# Patient Record
Sex: MALE | Race: WHITE | Employment: OTHER | URBAN - METROPOLITAN AREA
[De-identification: names, ages, dates, MRNs, and addresses within clinical notes are randomized per-mention and may not be internally consistent; named-entity substitution may affect disease eponyms.]

---

## 2017-05-22 PROBLEM — R29.898 WEAKNESS OF BOTH LEGS: Status: ACTIVE | Noted: 2017-05-22

## 2017-05-22 PROBLEM — Z91.89 STROKE RISK: Status: ACTIVE | Noted: 2017-05-22

## 2017-05-22 PROBLEM — G62.9 AXONAL POLYNEUROPATHY: Status: ACTIVE | Noted: 2017-05-22

## 2018-06-19 PROBLEM — M79.2 NEUROPATHIC PAIN: Status: ACTIVE | Noted: 2018-06-19

## 2019-02-25 PROBLEM — E66.01 SEVERE OBESITY (HCC): Status: ACTIVE | Noted: 2019-02-25

## 2019-12-02 PROBLEM — G56.03 BILATERAL CARPAL TUNNEL SYNDROME: Status: ACTIVE | Noted: 2019-12-02

## 2020-01-13 RX ORDER — SODIUM CHLORIDE 0.9 % (FLUSH) 0.9 %
5-40 SYRINGE (ML) INJECTION AS NEEDED
Status: CANCELLED | OUTPATIENT
Start: 2020-01-13

## 2020-01-13 RX ORDER — SODIUM CHLORIDE 0.9 % (FLUSH) 0.9 %
5-40 SYRINGE (ML) INJECTION EVERY 8 HOURS
Status: CANCELLED | OUTPATIENT
Start: 2020-01-13

## 2020-01-19 ENCOUNTER — ANESTHESIA EVENT (OUTPATIENT)
Dept: SURGERY | Age: 60
End: 2020-01-19
Payer: COMMERCIAL

## 2020-01-20 ENCOUNTER — ANESTHESIA (OUTPATIENT)
Dept: SURGERY | Age: 60
End: 2020-01-20
Payer: COMMERCIAL

## 2020-01-20 ENCOUNTER — HOSPITAL ENCOUNTER (OUTPATIENT)
Age: 60
Setting detail: OUTPATIENT SURGERY
Discharge: HOME | End: 2020-01-20
Attending: ORTHOPAEDIC SURGERY | Admitting: ORTHOPAEDIC SURGERY
Payer: COMMERCIAL

## 2020-01-20 VITALS
HEART RATE: 60 BPM | TEMPERATURE: 97.9 F | DIASTOLIC BLOOD PRESSURE: 78 MMHG | OXYGEN SATURATION: 95 % | BODY MASS INDEX: 35.94 KG/M2 | SYSTOLIC BLOOD PRESSURE: 140 MMHG | WEIGHT: 265 LBS | RESPIRATION RATE: 16 BRPM

## 2020-01-20 LAB — POTASSIUM BLD-SCNC: 4.1 MMOL/L (ref 3.5–5.1)

## 2020-01-20 PROCEDURE — 77030018836 HC SOL IRR NACL ICUM -A: Performed by: ORTHOPAEDIC SURGERY

## 2020-01-20 PROCEDURE — 76210000020 HC REC RM PH II FIRST 0.5 HR: Performed by: ORTHOPAEDIC SURGERY

## 2020-01-20 PROCEDURE — 74011250636 HC RX REV CODE- 250/636: Performed by: ANESTHESIOLOGY

## 2020-01-20 PROCEDURE — 74011000250 HC RX REV CODE- 250: Performed by: NURSE ANESTHETIST, CERTIFIED REGISTERED

## 2020-01-20 PROCEDURE — 76010000138 HC OR TIME 0.5 TO 1 HR: Performed by: ORTHOPAEDIC SURGERY

## 2020-01-20 PROCEDURE — 76210000063 HC OR PH I REC FIRST 0.5 HR: Performed by: ORTHOPAEDIC SURGERY

## 2020-01-20 PROCEDURE — 77030006603 HC BLD CRPL ENDOSC S&N -B: Performed by: ORTHOPAEDIC SURGERY

## 2020-01-20 PROCEDURE — 74011250636 HC RX REV CODE- 250/636: Performed by: ORTHOPAEDIC SURGERY

## 2020-01-20 PROCEDURE — 77030002986 HC SUT PROL J&J -A: Performed by: ORTHOPAEDIC SURGERY

## 2020-01-20 PROCEDURE — 76060000032 HC ANESTHESIA 0.5 TO 1 HR: Performed by: ORTHOPAEDIC SURGERY

## 2020-01-20 PROCEDURE — 74011000250 HC RX REV CODE- 250: Performed by: ORTHOPAEDIC SURGERY

## 2020-01-20 PROCEDURE — 84132 ASSAY OF SERUM POTASSIUM: CPT

## 2020-01-20 PROCEDURE — 77030010507 HC ADH SKN DERMBND J&J -B: Performed by: ORTHOPAEDIC SURGERY

## 2020-01-20 PROCEDURE — 77030000032 HC CUF TRNQT ZIMM -B: Performed by: ORTHOPAEDIC SURGERY

## 2020-01-20 PROCEDURE — 74011250636 HC RX REV CODE- 250/636: Performed by: NURSE ANESTHETIST, CERTIFIED REGISTERED

## 2020-01-20 RX ORDER — ONDANSETRON 2 MG/ML
INJECTION INTRAMUSCULAR; INTRAVENOUS AS NEEDED
Status: DISCONTINUED | OUTPATIENT
Start: 2020-01-20 | End: 2020-01-20 | Stop reason: HOSPADM

## 2020-01-20 RX ORDER — PROPOFOL 10 MG/ML
INJECTION, EMULSION INTRAVENOUS
Status: DISCONTINUED | OUTPATIENT
Start: 2020-01-20 | End: 2020-01-20 | Stop reason: HOSPADM

## 2020-01-20 RX ORDER — CLINDAMYCIN PHOSPHATE 900 MG/50ML
900 INJECTION INTRAVENOUS ONCE
Status: COMPLETED | OUTPATIENT
Start: 2020-01-20 | End: 2020-01-20

## 2020-01-20 RX ORDER — FLUMAZENIL 0.1 MG/ML
0.2 INJECTION INTRAVENOUS
Status: DISCONTINUED | OUTPATIENT
Start: 2020-01-20 | End: 2020-01-20 | Stop reason: HOSPADM

## 2020-01-20 RX ORDER — SODIUM CHLORIDE, SODIUM LACTATE, POTASSIUM CHLORIDE, CALCIUM CHLORIDE 600; 310; 30; 20 MG/100ML; MG/100ML; MG/100ML; MG/100ML
75 INJECTION, SOLUTION INTRAVENOUS CONTINUOUS
Status: DISCONTINUED | OUTPATIENT
Start: 2020-01-20 | End: 2020-01-20 | Stop reason: HOSPADM

## 2020-01-20 RX ORDER — LIDOCAINE HYDROCHLORIDE 20 MG/ML
INJECTION, SOLUTION EPIDURAL; INFILTRATION; INTRACAUDAL; PERINEURAL AS NEEDED
Status: DISCONTINUED | OUTPATIENT
Start: 2020-01-20 | End: 2020-01-20 | Stop reason: HOSPADM

## 2020-01-20 RX ORDER — SODIUM CHLORIDE, SODIUM LACTATE, POTASSIUM CHLORIDE, CALCIUM CHLORIDE 600; 310; 30; 20 MG/100ML; MG/100ML; MG/100ML; MG/100ML
100 INJECTION, SOLUTION INTRAVENOUS CONTINUOUS
Status: DISCONTINUED | OUTPATIENT
Start: 2020-01-20 | End: 2020-01-20 | Stop reason: HOSPADM

## 2020-01-20 RX ORDER — FENTANYL CITRATE 50 UG/ML
100 INJECTION, SOLUTION INTRAMUSCULAR; INTRAVENOUS AS NEEDED
Status: DISCONTINUED | OUTPATIENT
Start: 2020-01-20 | End: 2020-01-20 | Stop reason: HOSPADM

## 2020-01-20 RX ORDER — HYDROMORPHONE HYDROCHLORIDE 2 MG/ML
0.5 INJECTION, SOLUTION INTRAMUSCULAR; INTRAVENOUS; SUBCUTANEOUS
Status: DISCONTINUED | OUTPATIENT
Start: 2020-01-20 | End: 2020-01-20 | Stop reason: HOSPADM

## 2020-01-20 RX ORDER — LIDOCAINE HYDROCHLORIDE 10 MG/ML
INJECTION INFILTRATION; PERINEURAL AS NEEDED
Status: DISCONTINUED | OUTPATIENT
Start: 2020-01-20 | End: 2020-01-20 | Stop reason: HOSPADM

## 2020-01-20 RX ORDER — OXYCODONE HYDROCHLORIDE 5 MG/1
5 TABLET ORAL
Status: DISCONTINUED | OUTPATIENT
Start: 2020-01-20 | End: 2020-01-20 | Stop reason: HOSPADM

## 2020-01-20 RX ORDER — MIDAZOLAM HYDROCHLORIDE 1 MG/ML
2 INJECTION, SOLUTION INTRAMUSCULAR; INTRAVENOUS
Status: COMPLETED | OUTPATIENT
Start: 2020-01-20 | End: 2020-01-20

## 2020-01-20 RX ORDER — SODIUM CHLORIDE 0.9 % (FLUSH) 0.9 %
5-40 SYRINGE (ML) INJECTION EVERY 8 HOURS
Status: DISCONTINUED | OUTPATIENT
Start: 2020-01-20 | End: 2020-01-20 | Stop reason: HOSPADM

## 2020-01-20 RX ORDER — BUPIVACAINE HYDROCHLORIDE 5 MG/ML
INJECTION, SOLUTION EPIDURAL; INTRACAUDAL AS NEEDED
Status: DISCONTINUED | OUTPATIENT
Start: 2020-01-20 | End: 2020-01-20 | Stop reason: HOSPADM

## 2020-01-20 RX ORDER — LIDOCAINE HYDROCHLORIDE 10 MG/ML
0.1 INJECTION INFILTRATION; PERINEURAL AS NEEDED
Status: DISCONTINUED | OUTPATIENT
Start: 2020-01-20 | End: 2020-01-20 | Stop reason: HOSPADM

## 2020-01-20 RX ORDER — NALOXONE HYDROCHLORIDE 0.4 MG/ML
0.1 INJECTION, SOLUTION INTRAMUSCULAR; INTRAVENOUS; SUBCUTANEOUS AS NEEDED
Status: DISCONTINUED | OUTPATIENT
Start: 2020-01-20 | End: 2020-01-20 | Stop reason: HOSPADM

## 2020-01-20 RX ORDER — SODIUM CHLORIDE 0.9 % (FLUSH) 0.9 %
5-40 SYRINGE (ML) INJECTION AS NEEDED
Status: DISCONTINUED | OUTPATIENT
Start: 2020-01-20 | End: 2020-01-20 | Stop reason: HOSPADM

## 2020-01-20 RX ORDER — DIPHENHYDRAMINE HYDROCHLORIDE 50 MG/ML
12.5 INJECTION, SOLUTION INTRAMUSCULAR; INTRAVENOUS
Status: DISCONTINUED | OUTPATIENT
Start: 2020-01-20 | End: 2020-01-20 | Stop reason: HOSPADM

## 2020-01-20 RX ADMIN — MIDAZOLAM 2 MG: 1 INJECTION INTRAMUSCULAR; INTRAVENOUS at 10:35

## 2020-01-20 RX ADMIN — ONDANSETRON 4 MG: 2 INJECTION INTRAMUSCULAR; INTRAVENOUS at 11:02

## 2020-01-20 RX ADMIN — PROPOFOL 100 MCG/KG/MIN: 10 INJECTION, EMULSION INTRAVENOUS at 10:46

## 2020-01-20 RX ADMIN — LIDOCAINE HYDROCHLORIDE 40 MG: 20 INJECTION, SOLUTION EPIDURAL; INFILTRATION; INTRACAUDAL; PERINEURAL at 10:46

## 2020-01-20 RX ADMIN — SODIUM CHLORIDE, SODIUM LACTATE, POTASSIUM CHLORIDE, AND CALCIUM CHLORIDE 100 ML/HR: 600; 310; 30; 20 INJECTION, SOLUTION INTRAVENOUS at 09:51

## 2020-01-20 RX ADMIN — CLINDAMYCIN PHOSPHATE 900 MG: 900 INJECTION, SOLUTION INTRAVENOUS at 10:40

## 2020-01-20 NOTE — DISCHARGE INSTRUCTIONS
Keep dressing clean, dry and intact until post op day number 2-3. Then may shower, pat dry and keep covered until follow up. Do not scrub incision or submerge under water. Move fingers, elevate, and ice to prevent swelling. No heavy lifting. DIET  · Clear liquids until no nausea or vomiting; then light diet for the first day. · Advance to regular diet on second day, unless your doctor orders otherwise. · If nausea and vomiting continues, call your doctor. PAIN  · Take pain medication as directed by your doctor. · Call your doctor if pain is NOT relieved by medication. CALL YOUR DOCTOR IF   · Excessive bleeding that does not stop after holding pressure over the area  · Temperature of 101 degrees F or above  · Excessive redness, swelling or bruising, and/ or green or yellow, smelly discharge from incision    AFTER ANESTHESIA   · For the first 24 hours: DO NOT Drive, Drink alcoholic beverages, or Make important decisions. · Be aware of dizziness following anesthesia and while taking pain medication. APPOINTMENT DATE/ TIME  Keep as arranged    YOUR DOCTOR'S PHONE NUMBER [phone]      DISCHARGE SUMMARY from Nurse    PATIENT INSTRUCTIONS:    After general anesthesia or intravenous sedation, for 24 hours or while taking prescription Narcotics:  · Limit your activities  · Do not drive and operate hazardous machinery  · Do not make important personal or business decisions  · Do  not drink alcoholic beverages  · If you have not urinated within 8 hours after discharge, please contact your surgeon on call. *  Please give a list of your current medications to your Primary Care Provider. *  Please update this list whenever your medications are discontinued, doses are      changed, or new medications (including over-the-counter products) are added. *  Please carry medication information at all times in case of emergency situations.       These are general instructions for a healthy lifestyle:    No smoking/ No tobacco products/ Avoid exposure to second hand smoke    Surgeon General's Warning:  Quitting smoking now greatly reduces serious risk to your health. Obesity, smoking, and sedentary lifestyle greatly increases your risk for illness    A healthy diet, regular physical exercise & weight monitoring are important for maintaining a healthy lifestyle    You may be retaining fluid if you have a history of heart failure or if you experience any of the following symptoms:  Weight gain of 3 pounds or more overnight or 5 pounds in a week, increased swelling in our hands or feet or shortness of breath while lying flat in bed. Please call your doctor as soon as you notice any of these symptoms; do not wait until your next office visit. Recognize signs and symptoms of STROKE:    F-face looks uneven    A-arms unable to move or move unevenly    S-speech slurred or non-existent    T-time-call 911 as soon as signs and symptoms begin-DO NOT go       Back to bed or wait to see if you get better-TIME IS BRAIN.

## 2020-01-20 NOTE — BRIEF OP NOTE
BRIEF OPERATIVE NOTE    Date of Procedure: 1/20/2020   Preoperative Diagnosis: Carpal tunnel syndrome on left [G56.02]  Postoperative Diagnosis: Carpal tunnel syndrome on left [G56.02]    Procedure(s):  HAND CARPAL TUNNEL RELEASE ENDOSCOPIC LEFT  Surgeon(s) and Role:     * Augusta Espinosa MD - Primary         Surgical Assistant: RENE    Surgical Staff:  Circ-1: Hubert Flores RN  Scrub Tech-1: Real Dunbar  Scrub Tech-2: Evi Cruz  Event Time In Time Out   Incision Start 1054    Incision Close       Anesthesia: MAC   Estimated Blood Loss: MINIMAL  Specimens: * No specimens in log *   Findings: SEE DICTATION   Complications: NONE  Implants: * No implants in log *

## 2020-01-20 NOTE — ANESTHESIA POSTPROCEDURE EVALUATION
Procedure(s):  HAND CARPAL TUNNEL RELEASE ENDOSCOPIC LEFT.    total IV anesthesia    Anesthesia Post Evaluation        Patient location during evaluation: PACU  Patient participation: complete - patient participated  Level of consciousness: awake and alert  Pain management: adequate  Airway patency: patent  Anesthetic complications: no  Cardiovascular status: acceptable  Respiratory status: acceptable  Hydration status: acceptable  Post anesthesia nausea and vomiting:  controlled      Vitals Value Taken Time   /74 1/20/2020 11:20 AM   Temp 36.6 °C (97.9 °F) 1/20/2020 11:14 AM   Pulse 64 1/20/2020 11:21 AM   Resp 7 1/20/2020 11:21 AM   SpO2 95 % 1/20/2020 11:21 AM   Vitals shown include unvalidated device data.

## 2020-01-20 NOTE — OP NOTES
Carpal Tunnel Release Operative Report       DOS:  1/20/20  Preoperative diagnosis:  Carpal tunnel syndrome on left [G56.02]    Postoperative diagnosis: Carpal tunnel syndrome on left [G56.02]    Surgeon(s) and Role:     * Bruce Griffin MD - Primary     Anesthesia: MAC Local with MAC. Procedures: Procedure(s):  HAND CARPAL TUNNEL RELEASE ENDOSCOPIC LEFT     EBL/IV FLUIDS: Per Anesthesia. COMPLICATIONS: None. DISPOSITION: Stable to recovery room. INDICATIONS FOR PROCEDURE: The patient is a pleasant 26-year-old male with history of left carpal tunnel syndrome that has failed nonoperative measures. It was confirmed on preoperative nerve studies. After both operative and nonoperative treatment options were discussed, the decision was made to go ahead with a left endoscopic carpal tunnel release. Risks and benefits of the procedure were discussed including, but not limited to bleeding, infection, injury to adjacent structures consisting of tendon, artery, and nerve, need for additional procedures, wound dehiscence, scar formation, incomplete resolution of symptoms, recurrence of symptoms, and transient neuropraxia. Informed consent was obtained. PROCEDURE IN DETAIL: The patient was seen and marked in the preoperative suite. The patient was taken back to the OR, placed on the table in supine position with left upper extremities on hand tables. Left upper extremities were prepped and draped in standard sterile fashion. A formal timeout was performed confirming patient identification, preoperative antibiotics, and planned operative procedure. We infiltrated both planned incision sites with lidocaine and Marcaine, exsanguinated the left upper extremity and the tourniquet was placed up to 250 mmHg. A standard proximal incision was made just proximal to the distal palmar crease and ulnar to palmaris longus. Dissection was performed bluntly with Ragnell retractors.   The antebrachial fascia was identified and incised longitudinally. The carpal tunnel was entered with a spatula, staying ulnar throughout the procedure just radial to the hook of hamate. The undersurface of the trasverse carpal ligament was carefully scraped to remove adhesions. We placed the trocar in the same manner, ulnarly, made our distal incision and fully seated the trochar. We were able to see the entire transverse carpal ligament without difficulty. Under direct vision and in a proximal and distal manner, we incised the transverse carpal ligament ulnarly. We saw adequate retraction of leaflets and distal fat confirming complete release. Instruments were removed. We irrigated copiously with normal saline. The proximal incisionwas closed with Prolene and Dermabond glue. The distal incision was closed with Dermabond glue. The tourniquet was let down, the fingers had brisk capillary refill and a soft sterile dressing was placed. POSTOPERATIVE CARE: Early motion. No heavy lifting.  Followup in 2 weeks for suture removal.    Closure: Primary    Complications: None     Signed By: Beth Castellano MD

## 2020-01-20 NOTE — ANESTHESIA PREPROCEDURE EVALUATION
Relevant Problems   No relevant active problems       Anesthetic History   No history of anesthetic complications            Review of Systems / Medical History  Patient summary reviewed and pertinent labs reviewed    Pulmonary        Sleep apnea: CPAP  Smoker         Neuro/Psych             Comments: Neuropathy  Cardiovascular    Hypertension: well controlled              Exercise tolerance: >4 METS: Denied chest pain, SOB, syncope   Comments: Nuclear stress test 2017 - negative ischemia, normal perfusion, normal LV function    GI/Hepatic/Renal     GERD: well controlled           Endo/Other        Obesity and arthritis     Other Findings   Comments: Gout            Physical Exam    Airway  Mallampati: III  TM Distance: > 6 cm  Neck ROM: normal range of motion   Mouth opening: Normal     Cardiovascular  Regular rate and rhythm,  S1 and S2 normal,  no murmur, click, rub, or gallop             Dental  No notable dental hx       Pulmonary  Breath sounds clear to auscultation               Abdominal  GI exam deferred       Other Findings            Anesthetic Plan    ASA: 2  Anesthesia type: total IV anesthesia          Induction: Intravenous  Anesthetic plan and risks discussed with: Patient

## 2022-03-18 PROBLEM — E66.01 SEVERE OBESITY (HCC): Status: ACTIVE | Noted: 2019-02-25

## 2022-03-18 PROBLEM — G62.9 AXONAL POLYNEUROPATHY: Status: ACTIVE | Noted: 2017-05-22

## 2022-03-18 PROBLEM — G56.03 BILATERAL CARPAL TUNNEL SYNDROME: Status: ACTIVE | Noted: 2019-12-02

## 2022-03-19 PROBLEM — Z91.89 STROKE RISK: Status: ACTIVE | Noted: 2017-05-22

## 2022-03-19 PROBLEM — M79.2 NEUROPATHIC PAIN: Status: ACTIVE | Noted: 2018-06-19

## 2022-03-20 PROBLEM — R29.898 WEAKNESS OF BOTH LEGS: Status: ACTIVE | Noted: 2017-05-22

## 2022-08-15 ENCOUNTER — OFFICE VISIT (OUTPATIENT)
Dept: NEUROLOGY | Age: 62
End: 2022-08-15
Payer: COMMERCIAL

## 2022-08-15 VITALS
HEART RATE: 72 BPM | HEIGHT: 72 IN | WEIGHT: 244 LBS | BODY MASS INDEX: 33.05 KG/M2 | SYSTOLIC BLOOD PRESSURE: 132 MMHG | DIASTOLIC BLOOD PRESSURE: 87 MMHG

## 2022-08-15 DIAGNOSIS — R25.1 TREMOR OF LEFT HAND: ICD-10-CM

## 2022-08-15 DIAGNOSIS — G60.3 IDIOPATHIC PROGRESSIVE NEUROPATHY: ICD-10-CM

## 2022-08-15 DIAGNOSIS — M79.2 NEUROPATHIC PAIN: ICD-10-CM

## 2022-08-15 DIAGNOSIS — G62.9 POLYNEUROPATHY, UNSPECIFIED: Primary | ICD-10-CM

## 2022-08-15 DIAGNOSIS — R20.0 NUMBNESS AND TINGLING OF BOTH FEET: ICD-10-CM

## 2022-08-15 DIAGNOSIS — R20.2 NUMBNESS AND TINGLING OF BOTH FEET: ICD-10-CM

## 2022-08-15 PROCEDURE — 99215 OFFICE O/P EST HI 40 MIN: CPT | Performed by: PSYCHIATRY & NEUROLOGY

## 2022-08-15 PROCEDURE — 95910 NRV CNDJ TEST 7-8 STUDIES: CPT | Performed by: PSYCHIATRY & NEUROLOGY

## 2022-08-15 RX ORDER — PREGABALIN 50 MG/1
50 CAPSULE ORAL 3 TIMES DAILY
Qty: 270 CAPSULE | Refills: 3 | Status: SHIPPED | OUTPATIENT
Start: 2022-08-15 | End: 2023-08-15

## 2022-08-15 RX ORDER — DULOXETIN HYDROCHLORIDE 30 MG/1
30 CAPSULE, DELAYED RELEASE ORAL 2 TIMES DAILY
Qty: 180 CAPSULE | Refills: 3 | Status: SHIPPED | OUTPATIENT
Start: 2022-08-15

## 2022-08-15 ASSESSMENT — VISUAL ACUITY: VA_NORMAL: 1

## 2022-08-15 NOTE — PROGRESS NOTES
8/15/2022  Mark Hernandez 60 y.o. male      Chief Complaint:  Chief Complaint   Patient presents with    Numbness          Followup Note:   Here for a follow-up nerve conduction study to evaluate idiopathic progressive polyneuropathy. Additional time was spent for the discussion of management and a detailed neurological examination. Patient denied falling. Numbness tingling and burning sensation feet remained. Patient reported that he cannot feel lower legs when swimming. No weakness of legs. He has occasional lower back pain and frequent bilateral hip pain, evaluated by orthopedic service. He is physically active and exercises regularly. Drinks beer 8 to 10 cans/day for many years. He takes vitamin supplement. Reported Lyrica worked better than gabapentin, but concerning side effect of left hand tremor. Cymbalta 30 mg once or twice a day. Family history - her grandmother had Parkinson's disease. Review Test Results: I have reviewed imaging study and lab tests. MRI Lumbar Spine without Contrast      Impression  Performed by [facility]    1. Multilevel degenerative changes as described, which combines with prominent epidural fat to result in high-grade central canal stenosis at L3-L4, L4-L5, and L5-S1, most pronounced at L4-L5. 2. Multiple levels of neural foraminal narrowing, including moderate right and severe left neural foraminal stenosis at L5-S1 and moderate right neural foraminal stenosis at L4-L5.     3. Chronic-appearing L1 compression deformity. Signed by: 1/28/2022 4:03 PM: Lesley Jacques      Current Outpatient Medications   Medication Sig Dispense Refill    DULoxetine (CYMBALTA) 30 MG extended release capsule Take 1 capsule by mouth in the morning and 1 capsule before bedtime. 180 capsule 3    pregabalin (LYRICA) 50 MG capsule Take 1 capsule by mouth in the morning and 1 capsule at noon and 1 capsule before bedtime.  Take 1 tab TID. 270 capsule 3    allopurinol (ZYLOPRIM) 300 MG tablet 300 mg      amLODIPine (NORVASC) 5 MG tablet 5 mg      aspirin 325 MG tablet Take 325 mg by mouth      atorvastatin (LIPITOR) 20 MG tablet 20 mg      carvedilol (COREG) 25 MG tablet 2 times daily      hydroCHLOROthiazide (HYDRODIURIL) 25 MG tablet daily      lisinopril (PRINIVIL;ZESTRIL) 40 MG tablet daily      omeprazole (PRILOSEC) 40 MG delayed release capsule 40 mg      Suvorexant 20 MG TABS Take 20 mg by mouth. traMADol (ULTRAM) 50 MG tablet Take 50 mg by mouth every 6 hours as needed. No current facility-administered medications for this visit. Allergies   Allergen Reactions    Penicillins Other (See Comments)         Review of Systems:  Review of Systems   Musculoskeletal:  Positive for joint pain. Neurological:  Positive for tingling and sensory change. Psychiatric/Behavioral:  Negative for memory loss. Examination:  Vitals:    08/15/22 1029   BP: 132/87   Pulse: 72   Weight: 244 lb (110.7 kg)   Height: 6' (1.829 m)        Physical Exam  Vitals reviewed. Constitutional:       Appearance: Normal appearance. HENT:      Head: Normocephalic. Eyes:      Extraocular Movements: Extraocular movements intact and EOM normal.      Conjunctiva/sclera: Conjunctivae normal.      Pupils: Pupils are equal, round, and reactive to light. Cardiovascular:      Rate and Rhythm: Normal rate. Pulmonary:      Effort: Pulmonary effort is normal.   Musculoskeletal:         General: Normal range of motion. Cervical back: Normal range of motion. Skin:     General: Skin is warm and dry. Neurological:      Mental Status: He is alert and oriented to person, place, and time. Mental status is at baseline. Cranial Nerves: No cranial nerve deficit. Sensory: Sensory deficit present. Motor: No weakness. Coordination: Coordination normal.      Gait: Gait is intact.  Gait normal.      Deep Tendon Reflexes: Strength normal. Reflexes abnormal. Reflex Scores:       Tricep reflexes are 2+ on the right side and 2+ on the left side. Bicep reflexes are 2+ on the right side and 2+ on the left side. Brachioradialis reflexes are 2+ on the right side and 2+ on the left side. Patellar reflexes are 2+ on the right side and 2+ on the left side. Achilles reflexes are 0 on the right side and 0 on the left side. Psychiatric:         Mood and Affect: Mood normal.         Speech: Speech normal.         Behavior: Behavior normal.         Thought Content: Thought content normal.         Judgment: Judgment normal.        Neurologic Exam     Mental Status   Oriented to person, place, and time. Concentration: normal.   Speech: speech is normal   Level of consciousness: alert  Knowledge: good. Normal comprehension. Cranial Nerves     CN II   Visual fields full to confrontation. Visual acuity: normal  Right visual field deficit: none  Left visual field deficit: none     CN III, IV, VI   Pupils are equal, round, and reactive to light. Extraocular motions are normal.   Right pupil: Size: 3 mm. Shape: regular. Reactivity: brisk. Left pupil: Size: 3 mm. Shape: regular. Reactivity: brisk. Nystagmus: none   Diplopia: none  Ophthalmoparesis: none  Upgaze: normal    CN VII   Facial expression full, symmetric. CN VIII   CN VIII normal.     Motor Exam   Muscle bulk: normal  Overall muscle tone: normal  Right arm pronator drift: absent  Left arm pronator drift: absent    Strength   Strength 5/5 throughout.    EHL toe flexors remained good     Sensory Exam   Right arm light touch: normal  Left arm light touch: normal  Right leg light touch: decreased from knee  Left leg light touch: decreased from knee  Right arm vibration: normal  Left arm vibration: normal  Right leg vibration: decreased from knee  Left leg vibration: decreased from knee  Right arm pinprick: normal  Left arm pinprick: normal  Right leg pinprick: decreased from knee  Left leg pinprick: decreased from knee    Gait, Coordination, and Reflexes     Gait  Gait: normal    Tremor   Resting tremor: absent  Intention tremor: absent  Action tremor: left arm    Reflexes   Right brachioradialis: 2+  Left brachioradialis: 2+  Right biceps: 2+  Left biceps: 2+  Right triceps: 2+  Left triceps: 2+  Right patellar: 2+  Left patellar: 2+  Right achilles: 0  Left achilles: 0      Assessment / Plan:    Ernie Machuca was seen today for numbness. Diagnoses and all orders for this visit:    Polyneuropathy, unspecified  -     Motor &/sens 7-8 nerve conduction test    Idiopathic progressive neuropathy  -     Motor &/sens 7-8 nerve conduction test  -     DULoxetine (CYMBALTA) 30 MG extended release capsule; Take 1 capsule by mouth in the morning and 1 capsule before bedtime. -     pregabalin (LYRICA) 50 MG capsule; Take 1 capsule by mouth in the morning and 1 capsule at noon and 1 capsule before bedtime. Take 1 tab TID. Numbness and tingling of both feet  -     Motor &/sens 7-8 nerve conduction test    Neuropathic pain  -     DULoxetine (CYMBALTA) 30 MG extended release capsule; Take 1 capsule by mouth in the morning and 1 capsule before bedtime. -     pregabalin (LYRICA) 50 MG capsule; Take 1 capsule by mouth in the morning and 1 capsule at noon and 1 capsule before bedtime. Take 1 tab TID. Tremor of left hand     Based on today's test results, bilateral tibial motor mildly worse, the rest unchanged. Examination showed normal muscle strength, sensory deficits as long stocking distribution and vibratory sensation worse than before. Patient was advised to continue regular physical exercise, his diabetes is under good control. Advised him to cut down alcohol intake. Has a concern of left upper extremity postural tremor, thought was from Lyrica side effect, he may reduce dosage and observe response. Patient did not show parkinsonian.     I have spent 40 min, greater than 50% of discussing and counseling with patient, for treatment and diagnostic plan review.

## 2022-08-15 NOTE — PROGRESS NOTES
[address], [address], [address] [facility]       Nerve Conduction Study and Electromyogram Report           Hx: 60 y.o. male with complaint of numbness and tingling of feet. Pt presenting for 4 th EMG/NCV repeat study as follow up. Clinical summary was reviewed. Neurological examination: Motor power showed normal. There was no atrophy. There were no fasciculations. Deep tendon reflexes were present and symmetrical at 2+ but absent at the ankles. Sensory deficit was in stockings distribution. Gait was stable. Description: Nerve conduction studies were performed on the right lower extremity and left lower extremity, using standard technique. Bilateral sural response was absent (unchanged). Bilateral peroneal motor amplitudes and CV were reduced (unchanged). Bilateral tibial motor amplitudes and CV were reduced (worse). F-wave latencies were prolonged (worse). H reflex response was absent bilaterally. Needle electromyography was not performed to absent weakness or radiculopathic sign. Previous EMG study was reviewed showing low degree of denervation changes in the right gastrocnemius and same side paraspinal muscles. Conclusion: This study showed neurophysiologic evidence of sensorimotor axonal polyneuropathy, moderate in degree, worse on bilateral tibial motor response compared with the previous study.           Procedure Details: Under procedure category          Dealno Pardo MD

## 2022-12-28 DIAGNOSIS — G60.3 IDIOPATHIC PROGRESSIVE NEUROPATHY: ICD-10-CM

## 2022-12-28 DIAGNOSIS — M79.2 NEUROPATHIC PAIN: Primary | ICD-10-CM

## 2022-12-28 RX ORDER — PREGABALIN 50 MG/1
50 CAPSULE ORAL 3 TIMES DAILY
Qty: 270 CAPSULE | Refills: 3 | Status: SHIPPED | OUTPATIENT
Start: 2022-12-28 | End: 2022-12-28 | Stop reason: SDUPTHER

## 2022-12-28 RX ORDER — PREGABALIN 50 MG/1
50 CAPSULE ORAL 3 TIMES DAILY
Qty: 270 CAPSULE | Refills: 3 | Status: SHIPPED | OUTPATIENT
Start: 2022-12-28 | End: 2022-12-29 | Stop reason: SDUPTHER

## 2022-12-29 DIAGNOSIS — M79.2 NEUROPATHIC PAIN: ICD-10-CM

## 2022-12-29 RX ORDER — PREGABALIN 50 MG/1
50 CAPSULE ORAL 3 TIMES DAILY
Qty: 21 CAPSULE | Refills: 0 | Status: SHIPPED | OUTPATIENT
Start: 2022-12-29 | End: 2023-01-05

## 2022-12-29 RX ORDER — PREGABALIN 50 MG/1
50 CAPSULE ORAL 3 TIMES DAILY
Qty: 270 CAPSULE | Refills: 3 | Status: SHIPPED | OUTPATIENT
Start: 2022-12-29 | End: 2023-12-29

## 2023-02-13 ENCOUNTER — OFFICE VISIT (OUTPATIENT)
Dept: NEUROLOGY | Age: 63
End: 2023-02-13
Payer: COMMERCIAL

## 2023-02-13 VITALS
DIASTOLIC BLOOD PRESSURE: 75 MMHG | BODY MASS INDEX: 33.63 KG/M2 | SYSTOLIC BLOOD PRESSURE: 139 MMHG | HEART RATE: 68 BPM | WEIGHT: 248 LBS

## 2023-02-13 DIAGNOSIS — G56.22 LESION OF LEFT ULNAR NERVE: ICD-10-CM

## 2023-02-13 DIAGNOSIS — G60.3 IDIOPATHIC PROGRESSIVE NEUROPATHY: ICD-10-CM

## 2023-02-13 DIAGNOSIS — G56.03 CARPAL TUNNEL SYNDROME, BILATERAL: ICD-10-CM

## 2023-02-13 DIAGNOSIS — M79.2 NEUROPATHIC PAIN: ICD-10-CM

## 2023-02-13 DIAGNOSIS — G56.22 ULNAR NERVE ENTRAPMENT AT ELBOW, LEFT: ICD-10-CM

## 2023-02-13 DIAGNOSIS — R20.0 NUMBNESS AND TINGLING IN BOTH HANDS: Primary | ICD-10-CM

## 2023-02-13 DIAGNOSIS — F10.10 ALCOHOL ABUSE: ICD-10-CM

## 2023-02-13 DIAGNOSIS — R20.2 NUMBNESS AND TINGLING IN BOTH HANDS: Primary | ICD-10-CM

## 2023-02-13 PROCEDURE — 95885 MUSC TST DONE W/NERV TST LIM: CPT | Performed by: PSYCHIATRY & NEUROLOGY

## 2023-02-13 PROCEDURE — 95913 NRV CNDJ TEST 13/> STUDIES: CPT | Performed by: PSYCHIATRY & NEUROLOGY

## 2023-02-13 RX ORDER — DULOXETIN HYDROCHLORIDE 30 MG/1
30 CAPSULE, DELAYED RELEASE ORAL 2 TIMES DAILY
Qty: 180 CAPSULE | Refills: 3 | Status: SHIPPED | OUTPATIENT
Start: 2023-02-13

## 2023-02-13 NOTE — PROGRESS NOTES
[address], [address] [facility], [address] [place] [address]       Nerve Conduction Study and Electromyogram Report           Hx: 60 y.o. male with complaint of worsening bilateral hand numbness tingling. Left carpal tunnel release in April 2020, nerve conduction study last one in November 2021, which was compared with today's study. Significant past medical history includes Diabetes a1c 6.4, daily alcohol beer 7-8 cans. [employer] work daily, using vibrating drive. Denied neck pain or radiculopathic sign. Left dorsal lateral wrist injury 4 months ago which gave him numbness in the same side thumb. Clinical summary was reviewed. Neurological examination: Motor power showed normal on upper limbs except left 5th FDP slightly weak. There was no atrophy. There were no fasciculations. Pinprick gave him touch sensation in all fingers. Gait stable. Description: Nerve conduction studies were performed on the right upper extremity and left upper extremity, using standard technique. Bilateral median sensory amplitudes and CV were reduced, worse compared with the previous study. Bilateral ulnar sensory amplitudes were reduced, left side worse compared with the previous study, CV normal on the right and slightly reduced on the left. Right radial sensory was normal; left amplitude slightly reduced to 16.1 compared with the right 33.2 µV, CV normal bilaterally. Transcarpal study was abnormal showing median peak latency delay by 1.13 MS on the right and 1.02 MS on the left, significantly worse compared with the previous study. Median motor distal latency mildly increased 4.13 MS, amplitude and CV were normal; left distal latency prolonged 4.85 MS, amplitude mildly reduced 4.1 mV, CV normal 53, left side got worse. Ulnar motor was normal; left distal latencies and amplitudes were normal, CV reduced across the elbow ADM 53/45 and FDI 51/39.   F-wave latency prolonged left ulnar 33.3 MS, the rest mildly prolonged 31.6-31.9 MS. Needle electromyography was performed on the left upper extremity selectively, using standard concentric electrodes. Left APB, FDI and ADM were all normal        Conclusion: This study showed neurophysiologic evidence of several abnormalities 1) bilateral carpal tunnel syndrome, moderate in degree on both sides, worse on the left. Condition significantly worse compared with the previous study in November 2021. 2) left-sided ulnar nerve entrapment at the elbow without motor amplitude drop or axonal loss on EMG, a new condition since the last study. 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 Will arrange hand therapy to reduce symptoms.         Procedure Details: Under procedure category          Ramón Jackson MD

## 2023-02-24 ENCOUNTER — TELEPHONE (OUTPATIENT)
Dept: NEUROLOGY | Age: 63
End: 2023-02-24

## 2023-02-24 DIAGNOSIS — G62.9 POLYNEUROPATHY, UNSPECIFIED: ICD-10-CM

## 2023-02-24 DIAGNOSIS — G56.03 CARPAL TUNNEL SYNDROME, BILATERAL: Primary | ICD-10-CM

## 2023-02-24 DIAGNOSIS — G56.22 ULNAR NERVE ENTRAPMENT AT ELBOW, LEFT: ICD-10-CM

## 2023-02-24 NOTE — TELEPHONE ENCOUNTER
Orders Placed This Encounter    External Referral to Occupational Therapy     Referral Priority:   Routine     Referral Type:   Eval and Treat     Referral Reason:   Specialty Services Required     Requested Specialty:   Occupational Therapy     Number of Visits Requested:   1

## 2023-03-02 ENCOUNTER — TELEPHONE (OUTPATIENT)
Dept: NEUROLOGY | Age: 63
End: 2023-03-02

## 2023-03-02 NOTE — TELEPHONE ENCOUNTER
Called pt left VM his referral was sent to [facility] and he would get a call to schedule the appt.

## 2023-03-24 DIAGNOSIS — M79.2 NEUROPATHIC PAIN: ICD-10-CM

## 2023-03-24 RX ORDER — PREGABALIN 50 MG/1
50 CAPSULE ORAL 3 TIMES DAILY
Qty: 270 CAPSULE | Refills: 3 | Status: SHIPPED | OUTPATIENT
Start: 2023-03-24 | End: 2024-03-23

## 2023-10-03 DIAGNOSIS — M79.2 NEUROPATHIC PAIN: ICD-10-CM

## 2023-10-09 RX ORDER — PREGABALIN 50 MG/1
50 CAPSULE ORAL 3 TIMES DAILY
Qty: 270 CAPSULE | OUTPATIENT
Start: 2023-10-09

## 2023-10-11 DIAGNOSIS — M79.2 NEUROPATHIC PAIN: ICD-10-CM

## 2023-10-11 NOTE — TELEPHONE ENCOUNTER
pt requesting a refill on the pended medication, and the correct pharmacy is pended as well. Thanks.

## 2023-10-12 RX ORDER — PREGABALIN 50 MG/1
50 CAPSULE ORAL 3 TIMES DAILY
Qty: 270 CAPSULE | Refills: 3 | Status: SHIPPED | OUTPATIENT
Start: 2023-10-12 | End: 2024-10-11

## 2024-02-12 ENCOUNTER — PROCEDURE VISIT (OUTPATIENT)
Dept: NEUROLOGY | Age: 64
End: 2024-02-12
Payer: COMMERCIAL

## 2024-02-12 VITALS — OXYGEN SATURATION: 96 % | BODY MASS INDEX: 34.18 KG/M2 | WEIGHT: 252 LBS | HEART RATE: 76 BPM

## 2024-02-12 DIAGNOSIS — R20.2 NUMBNESS AND TINGLING OF BOTH FEET: Primary | ICD-10-CM

## 2024-02-12 DIAGNOSIS — F10.10 ALCOHOL ABUSE: ICD-10-CM

## 2024-02-12 DIAGNOSIS — G62.9 POLYNEUROPATHY: ICD-10-CM

## 2024-02-12 DIAGNOSIS — R55 SYNCOPE AND COLLAPSE: ICD-10-CM

## 2024-02-12 DIAGNOSIS — R20.0 NUMBNESS AND TINGLING OF BOTH FEET: Primary | ICD-10-CM

## 2024-02-12 PROCEDURE — 95911 NRV CNDJ TEST 9-10 STUDIES: CPT | Performed by: PSYCHIATRY & NEUROLOGY

## 2024-02-12 PROCEDURE — 99214 OFFICE O/P EST MOD 30 MIN: CPT | Performed by: PSYCHIATRY & NEUROLOGY

## 2024-02-12 PROCEDURE — 95885 MUSC TST DONE W/NERV TST LIM: CPT | Performed by: PSYCHIATRY & NEUROLOGY

## 2024-02-12 NOTE — PROGRESS NOTES
EMG/Nerve Conduction Study Procedure Note  [address]    [address]  [place], [place]  [id number] [phone]      Hx:    Exam:     63 y.o. M  male  for EMG lowers... prev + axonal neuropathy. Polyneuropathy.   Seen here per Dr ARMANDO.  EMGs prev axonal. No atrophy.   No Babinski.  Hx paresthesia . Hx multiple syncope.  History of EtOH.  Prediabetes diabetes highest A1c 6.8.  CABG.  Aortic history.  Ref / PCP::   Dr wei Zhao  Tech::  Mary Briggs   Hgt::   5'5\"        Summary             needle EMG bilateral lower extremities with CV.       Controlled environmental factors / EMG lab.  Temperature.   NCV : sensory segments:    Markedly abnormal = absent/NO RESPONSE SNAP of bilateral sural sensory nerve conductions.  NCV plantar sensory segments:     Deferred.  NCV Motor MCV segments:     Markedly abnormal = absent right peroneal CMAP; prolonged TL left peroneal with severe attenuation of CMAP and slowing proximally.  Markedly abnormal bilateral tibial with absent/no response of the CMAP basically.  Proximal peroneal segments are essentially normal.  F-wave studies:        Markedly abnormal = absent/especially on the left peroneal tibial.  H-REFLEX Studies:    Markedly abnormal = the right H reflex is absent the left H reflex is severely prolonged and delayed.   NEEDLE EMG:   Tested muscles::    Normal left TA MG VL muscles =  Normal insertional activity and interference pattern/recruitment.  No fasciculations fibrillations positive sharp waves.  Normal MUP.  No BSS AP.  No giant MUP.  No myotonia.  No upper motor neuron sign.           INTERPRETATION:   THESE MARKEDLY ABNORMAL ELECTROPHYSIOLOGIC FINDINGS REVEAL DIFFUSE MIXED AXONAL AS WELL AS AXONAL-DEMYELINATING LENGTH-DEPENDENT POLYNEUROPATHY.  NO MYOPATHY.  NO MYOTONIA.  NO FASCICULATION.              CONCLUSION:     Compatible with a generalized length-dependent sensorimotor severe axonal and as well demyelinating mixed polyneuropathy.      Procedure 
  Weight: 114.3 kg (252 lb)        Physical Exam  Vitals reviewed.   Constitutional:       General: He is awake. He is not in acute distress.     Appearance: He is well-developed and well-groomed. He is not ill-appearing, toxic-appearing or diaphoretic.   HENT:      Head: Normocephalic and atraumatic. No raccoon eyes, abrasion, contusion, right periorbital erythema, left periorbital erythema or laceration.      Right Ear: Hearing normal.      Left Ear: Hearing normal.   Eyes:      General: Lids are normal.      Extraocular Movements: Extraocular movements intact.      Conjunctiva/sclera: Conjunctivae normal.      Pupils: Pupils are equal, round, and reactive to light.   Neck:      Trachea: Phonation normal.   Pulmonary:      Effort: Pulmonary effort is normal. No respiratory distress.      Breath sounds: No wheezing.   Musculoskeletal:         General: No swelling, deformity or signs of injury.      Cervical back: Normal range of motion. No rigidity or torticollis.      Right lower leg: No edema.      Left lower leg: No edema.   Skin:     General: Skin is warm and dry.      Capillary Refill: Capillary refill takes less than 2 seconds.      Coloration: Skin is not ashen, cyanotic, jaundiced or pale.      Nails: There is no clubbing.   Neurological:      Mental Status: He is alert and easily aroused. Mental status is at baseline.      Cranial Nerves: Cranial nerves 2-12 are intact. No cranial nerve deficit or dysarthria.      Sensory: Sensory deficit present.      Motor: No weakness, tremor, atrophy or seizure activity.      Coordination: Coordination normal.      Gait: Gait normal.      Deep Tendon Reflexes: Reflexes abnormal.   Psychiatric:         Attention and Perception: Attention normal.         Mood and Affect: Mood normal.         Speech: Speech normal.         Behavior: Behavior normal. Behavior is cooperative.         Cognition and Memory: Cognition and memory normal.          Neurologic Exam     Mental

## 2024-03-15 ENCOUNTER — PROCEDURE VISIT (OUTPATIENT)
Dept: NEUROLOGY | Age: 64
End: 2024-03-15
Payer: COMMERCIAL

## 2024-03-15 DIAGNOSIS — R55 SYNCOPE AND COLLAPSE: Primary | ICD-10-CM

## 2024-03-15 PROCEDURE — 95921 AUTONOMIC NRV PARASYM INERVJ: CPT | Performed by: PSYCHIATRY & NEUROLOGY

## 2024-03-15 NOTE — PROGRESS NOTES
Marko Leigh : 1960 DOS: 3/15/2024             AUTONOMIC TESTING::  Evaluation LOC / syncope . 64 yo M W M 6'0\".        R-R INTERVAL::     *Ratio::           R-R Shalom:         HRVariance:               RESTING::      1.09                2.24     2.25       6 BREATHS =       1.12               2.28     2.28       SUPINE =    BP =    134/83           HR =     66      SITTING =   BP =    143/89         HR =      66   STANDING =  BP = 146/88        HR =       66      SYMPATHETIC SKIN RESPONSE::    PALM:   0.62         FOOT:      1.04                CONCLUSION::             NORMAL STUDIES.    NO IDENTIFIED DYSAUTONOMIA.          [ Parts of this report may have been dictated using artificial dictation with its inherent possibilities of errors.]              Ned Mullen MD  Consultative Neurology, [place] [address]  Phone:  [phone]  Fax:   [phone]

## 2024-06-19 ENCOUNTER — OFFICE VISIT (OUTPATIENT)
Dept: NEUROLOGY | Age: 64
End: 2024-06-19
Payer: COMMERCIAL

## 2024-06-19 VITALS
DIASTOLIC BLOOD PRESSURE: 78 MMHG | HEART RATE: 78 BPM | OXYGEN SATURATION: 95 % | WEIGHT: 254 LBS | SYSTOLIC BLOOD PRESSURE: 143 MMHG | BODY MASS INDEX: 34.45 KG/M2

## 2024-06-19 DIAGNOSIS — R20.0 NUMBNESS AND TINGLING OF BOTH FEET: ICD-10-CM

## 2024-06-19 DIAGNOSIS — G62.1 ALCOHOLIC PERIPHERAL NEUROPATHY (HCC): Primary | ICD-10-CM

## 2024-06-19 DIAGNOSIS — M79.2 NEUROPATHIC PAIN: ICD-10-CM

## 2024-06-19 DIAGNOSIS — G62.9 POLYNEUROPATHY: ICD-10-CM

## 2024-06-19 DIAGNOSIS — R20.2 NUMBNESS AND TINGLING OF BOTH FEET: ICD-10-CM

## 2024-06-19 PROCEDURE — 99215 OFFICE O/P EST HI 40 MIN: CPT | Performed by: PSYCHIATRY & NEUROLOGY

## 2024-06-19 RX ORDER — PREGABALIN 50 MG/1
50 CAPSULE ORAL 3 TIMES DAILY
Qty: 21 CAPSULE | Refills: 0 | Status: CANCELLED | OUTPATIENT
Start: 2024-06-19 | End: 2024-06-26

## 2024-06-19 ASSESSMENT — PATIENT HEALTH QUESTIONNAIRE - PHQ9
SUM OF ALL RESPONSES TO PHQ QUESTIONS 1-9: 0
1. LITTLE INTEREST OR PLEASURE IN DOING THINGS: NOT AT ALL
2. FEELING DOWN, DEPRESSED OR HOPELESS: NOT AT ALL
SUM OF ALL RESPONSES TO PHQ QUESTIONS 1-9: 0
DEPRESSION UNABLE TO ASSESS: FUNCTIONAL CAPACITY MOTIVATION LIMITS ACCURACY
SUM OF ALL RESPONSES TO PHQ9 QUESTIONS 1 & 2: 0

## 2024-06-19 ASSESSMENT — ENCOUNTER SYMPTOMS
SORE THROAT: 0
SHORTNESS OF BREATH: 0
COUGH: 0
EYE REDNESS: 0
EYE DISCHARGE: 0
ABDOMINAL DISTENTION: 0

## 2024-06-19 NOTE — PROGRESS NOTES
[facility] NEUROLOGY FOLLOW-UP NOTE    Patient: Marko Leigh  Physician: Lakeshia Gomez MD    CC: No chief complaint on file.      PCP: Simón Zhao MD  Referring Provider: No ref. provider found    History of Present Illness:     Interval History on 6/19/2024:  Marko Leigh is a 63 y.o. 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  Patient continues to have numbness and pain in both feet.  He reports significant improvement since he had aortobifem bypass  addressed.  He does take his neuropathic medications differently.  He only takes Lyrica 50 milligrams twice a day, states he had increased drowsiness on 50 mg 3 times a day.  He only takes Cymbalta 30 mg daily-states he cut down because of improvement after bypass.  Still high burden of breakthrough neuropathic pain in his feet which worsened again a few months followed by past surgery.  Unfortunately patient continues to drink 10-12 beers per day.  I did explain to him that continuing to drink a lot of alcohol beverages might be associated with worsening of his peripheral neuropathy.  He also had worsening of his diabetic control recently with increased hemoglobin A1c.      Original HPI 2/12/2024  Paresthesia of lower extremities.  Lower extremity weakness.  Syncope.  63-year-old  right-handed white male with lower extremity complaints.  Previous EMGs several times a revealed neuropathy/polyneuropathy of a generalized axonal type.  He saw Dr. Artur Vila.  He continues to have problems with his lower extremities.  He also has history of excessive consumption of ethanol.  Early diabetes.  Syncope occurred recently with patient arriving at home having his usual activities finding himself on the floor very briefly with the sudden loss of consciousness.  This has happened more than once.    Review of Systems:   A

## 2025-01-06 ENCOUNTER — TELEPHONE (OUTPATIENT)
Dept: NEUROLOGY | Age: 65
End: 2025-01-06

## 2025-01-06 NOTE — TELEPHONE ENCOUNTER
Pt reports nocturnal seizure started in summer of 2024.  Pt reports coughing and then LOC and pt wife reports he seizes. Pt wife reports he has 2-3 episodes a month. Pt reports they have been to PCP about this already. Pt reports he is going out of town Jan 13-22. I informed pt you were out of the office until the 12th and if symptoms worsen or become severe go to ED.

## 2025-02-28 ENCOUNTER — OFFICE VISIT (OUTPATIENT)
Dept: NEUROLOGY | Age: 65
End: 2025-02-28

## 2025-02-28 VITALS
OXYGEN SATURATION: 92 % | SYSTOLIC BLOOD PRESSURE: 146 MMHG | DIASTOLIC BLOOD PRESSURE: 84 MMHG | BODY MASS INDEX: 34.86 KG/M2 | HEART RATE: 73 BPM | WEIGHT: 257 LBS

## 2025-02-28 DIAGNOSIS — R55 COUGH SYNCOPE: Primary | ICD-10-CM

## 2025-02-28 DIAGNOSIS — R20.0 NUMBNESS AND TINGLING OF BOTH FEET: ICD-10-CM

## 2025-02-28 DIAGNOSIS — R05.4 COUGH SYNCOPE: Primary | ICD-10-CM

## 2025-02-28 DIAGNOSIS — M79.2 NEUROPATHIC PAIN: ICD-10-CM

## 2025-02-28 DIAGNOSIS — R41.0 CONFUSION: ICD-10-CM

## 2025-02-28 DIAGNOSIS — F10.988 COGNITIVE DYSFUNCTION, ALCOHOL-RELATED (HCC): ICD-10-CM

## 2025-02-28 DIAGNOSIS — G62.1 ALCOHOLIC PERIPHERAL NEUROPATHY: ICD-10-CM

## 2025-02-28 DIAGNOSIS — F10.10 ALCOHOL ABUSE: ICD-10-CM

## 2025-02-28 DIAGNOSIS — R20.2 NUMBNESS AND TINGLING OF BOTH FEET: ICD-10-CM

## 2025-02-28 DIAGNOSIS — Z79.899 MEDICATION MANAGEMENT: ICD-10-CM

## 2025-02-28 LAB — VIT B12 SERPL-MCNC: 1085 PG/ML (ref 193–986)

## 2025-02-28 RX ORDER — PREGABALIN 25 MG/1
25 CAPSULE ORAL 2 TIMES DAILY
COMMUNITY

## 2025-02-28 RX ORDER — THIAMINE MONONITRATE (VIT B1) 100 MG
100 TABLET ORAL DAILY
Qty: 30 TABLET | Refills: 5 | Status: SHIPPED | OUTPATIENT
Start: 2025-02-28

## 2025-02-28 RX ORDER — DULOXETIN HYDROCHLORIDE 30 MG/1
30 CAPSULE, DELAYED RELEASE ORAL DAILY
COMMUNITY

## 2025-02-28 NOTE — PROGRESS NOTES
[facility] NEUROLOGY FOLLOW-UP NOTE    Patient: Marko Leigh  Physician: Lakeshia Gomez MD    CC:   Chief Complaint   Patient presents with    Follow-up     Pt reports his neuropathy is about the same. He reports he is having episodes when he eats, he will choke and LOC for about 15 seconds. This happens about twice a month, it started a few months ago. Had an episode 2 weeks ago where he did not remember he ate dinner already. Wife reports he will sometimes not make sense and zone out.         PCP: Simón Zhao MD  Referring Provider: No ref. provider found    History of Present Illness:     Interval History on 2/28/2025:  Marko Leigh is a 64 y.o. right-handed male who presents for follow-up management of alcohol-related polyneuropathy.  During today's appointment he brings up lost consciousness which started to occur after he coughs for several minutes.  It has been happening more frequently and patient is currently being worked up for the etiology of cough and choking he has been experiencing.  Explained to him and his wife that it is common to have loss of consciousness following cough especially strong cough and its named cough induced syncope.  I have minimal suspicion for his episodes to be seizure related.  Patient does not have any abnormal movements, no eye deviation, no urinary incontinence.  Unfortunately, he continue to endorse daily alcohol consumption.   Wife brings up episodic confusion. Not on thiamine supplementation which I advised patient to start taking.    Patient does take very small dose of Lyrica which I advised him to stop since in combination with daily significant alcohol intake though increases the risk of side effects.  Recommended him to continue to take duloxetine          Interval History on 6/19/2024:  Marko Leigh is a 63 y.o. right-handed male who presents for follow-up management of peripheral polyneuropathy secondary to a mix of 
FEVER

## 2025-03-02 LAB — VIT B6 SERPL-MCNC: 25.2 UG/L (ref 3.4–65.2)

## 2025-03-03 LAB — METHYLMALONATE SERPL-SCNC: 131 NMOL/L (ref 0–378)

## 2025-03-04 LAB — VIT B1 BLD-SCNC: 134.7 NMOL/L (ref 66.5–200)

## (undated) DEVICE — SYR 10ML LUER LOK 1/5ML GRAD --

## (undated) DEVICE — DERMABOND SKIN ADH 0.7ML -- DERMABOND ADVANCED 12/BX

## (undated) DEVICE — RETROGRADE KNIFE BOX OF 6: Brand: ECTRA

## (undated) DEVICE — STERILE HOOK LOCK LATEX FREE ELASTIC BANDAGE 2INX5YD: Brand: HOOK LOCK™

## (undated) DEVICE — AMD ANTIMICROBIAL GAUZE SPONGES,12 PLY USP TYPE VII, 0.2% POLYHEXAMETHYLENE BIGUANIDE HCI (PHMB): Brand: CURITY

## (undated) DEVICE — SUTURE NONABSORBABLE MONOFILAMENT 4-0 PS-2 18 IN BLU PROLENE 8682H

## (undated) DEVICE — BANDAGE,ELASTIC,ESMARK,STERILE,4"X9',LF: Brand: MEDLINE

## (undated) DEVICE — ZIMMER® STERILE DISPOSABLE TOURNIQUET CUFF WITH PLC, DUAL PORT, SINGLE BLADDER, 18 IN. (46 CM)

## (undated) DEVICE — HAND PACK: Brand: MEDLINE INDUSTRIES, INC.

## (undated) DEVICE — (D)PREP SKN CHLRAPRP APPL 26ML -- CONVERT TO ITEM 371833

## (undated) DEVICE — APPLICATOR COT-TIP 6IN WOOD -- 2/PK STRL

## (undated) DEVICE — SYRINGE EAR 2OZ ULC SLIMMER TIP FLAT BTM SUCT PWR DISP FOR

## (undated) DEVICE — DRAPE, FILM SHEET, 44X65 STERILE: Brand: MEDLINE

## (undated) DEVICE — NEEDLE HYPO 25GA L1.5IN BLU POLYPR HUB S STL REG BVL STR

## (undated) DEVICE — DRAPE,HAND,STERILE: Brand: MEDLINE

## (undated) DEVICE — PADDING CAST W2INXL4YD ST COT COHESIVE HND TEARABLE SPEC

## (undated) DEVICE — SOLUTION IV 1000ML 0.9% SOD CHL